# Patient Record
Sex: FEMALE | Race: WHITE
[De-identification: names, ages, dates, MRNs, and addresses within clinical notes are randomized per-mention and may not be internally consistent; named-entity substitution may affect disease eponyms.]

---

## 2023-02-20 ENCOUNTER — HOSPITAL ENCOUNTER (EMERGENCY)
Dept: HOSPITAL 4 - SED | Age: 58
Discharge: HOME | End: 2023-02-20
Payer: MEDICAID

## 2023-02-20 VITALS — SYSTOLIC BLOOD PRESSURE: 141 MMHG

## 2023-02-20 VITALS — SYSTOLIC BLOOD PRESSURE: 137 MMHG

## 2023-02-20 VITALS — BODY MASS INDEX: 36.8 KG/M2 | WEIGHT: 200 LBS | HEIGHT: 62 IN

## 2023-02-20 DIAGNOSIS — J45.909: Primary | ICD-10-CM

## 2023-02-20 DIAGNOSIS — R05.9: ICD-10-CM

## 2023-02-20 DIAGNOSIS — Z79.899: ICD-10-CM

## 2023-02-20 DIAGNOSIS — R06.02: ICD-10-CM

## 2023-02-20 PROCEDURE — 71045 X-RAY EXAM CHEST 1 VIEW: CPT

## 2023-02-20 PROCEDURE — 99283 EMERGENCY DEPT VISIT LOW MDM: CPT

## 2023-02-20 PROCEDURE — 94640 AIRWAY INHALATION TREATMENT: CPT

## 2023-02-20 NOTE — NUR
Patient given written and verbal discharge instructions and verbalizes 
understanding.  ER MD Ramirez discussed with patient the results and treatment 
provided. Patient in stable condition. ID arm band removed. 

Rx of Prednisone and Zithromax sent to preferred pharmacy. Patient educated on 
pain management and to follow up with PMD. 

Opportunity for questions provided and answered. Medication side effect fact 
sheet provided.

## 2023-02-20 NOTE — NUR
Pt brought by partner, Alert and appropiate to age , pt presents to ER with 
cough , congestion and runny nose, skin pink and warm ,cap refill <3.

## 2023-02-27 ENCOUNTER — HOSPITAL ENCOUNTER (EMERGENCY)
Dept: HOSPITAL 4 - SED | Age: 58
Discharge: HOME | End: 2023-02-27
Payer: MEDICAID

## 2023-02-27 VITALS — WEIGHT: 200 LBS | HEIGHT: 62 IN | BODY MASS INDEX: 36.8 KG/M2

## 2023-02-27 VITALS — SYSTOLIC BLOOD PRESSURE: 128 MMHG

## 2023-02-27 DIAGNOSIS — E78.5: ICD-10-CM

## 2023-02-27 DIAGNOSIS — Z20.822: ICD-10-CM

## 2023-02-27 DIAGNOSIS — J45.901: Primary | ICD-10-CM

## 2023-02-27 DIAGNOSIS — Z79.899: ICD-10-CM

## 2023-02-27 DIAGNOSIS — J32.9: ICD-10-CM

## 2023-02-27 DIAGNOSIS — R07.9: ICD-10-CM

## 2023-02-27 DIAGNOSIS — I10: ICD-10-CM

## 2023-02-27 DIAGNOSIS — R05.9: ICD-10-CM

## 2023-02-27 DIAGNOSIS — R09.81: ICD-10-CM

## 2023-02-27 PROCEDURE — 87804 INFLUENZA ASSAY W/OPTIC: CPT

## 2023-02-27 PROCEDURE — 94640 AIRWAY INHALATION TREATMENT: CPT

## 2023-02-27 PROCEDURE — 99284 EMERGENCY DEPT VISIT MOD MDM: CPT

## 2023-02-27 PROCEDURE — 87426 SARSCOV CORONAVIRUS AG IA: CPT

## 2023-02-27 PROCEDURE — 71045 X-RAY EXAM CHEST 1 VIEW: CPT

## 2023-02-27 PROCEDURE — 36415 COLL VENOUS BLD VENIPUNCTURE: CPT

## 2023-02-27 NOTE — NUR
Patient given written and verbal discharge instructions and verbalizes 
understanding.  ER MD discussed with patient the results and treatment 
provided. Patient in stable condition. ID arm band removed.

Rx of sudafed, prednisone, guifenissen given. Patient educated on pain 
management and to follow up with PMD. 

Opportunity for questions provided and answered. Medication side effect fact 
sheet provided.

## 2023-02-27 NOTE — NUR
Pt brought in by family for chief complaint dizziness, cough, facial 
congestion. Pt complains of wheezing at the night time. Pt states phleghm is 
green. Pt states completed prednisone and Z-kenneth from last week ER visit with MD Ramirez.